# Patient Record
Sex: FEMALE | Race: WHITE | NOT HISPANIC OR LATINO | Employment: OTHER | URBAN - METROPOLITAN AREA
[De-identification: names, ages, dates, MRNs, and addresses within clinical notes are randomized per-mention and may not be internally consistent; named-entity substitution may affect disease eponyms.]

---

## 2023-08-10 LAB — CRC RECOMMENDATION EXT: NORMAL

## 2024-02-01 ENCOUNTER — OFFICE VISIT (OUTPATIENT)
Dept: GASTROENTEROLOGY | Facility: CLINIC | Age: 74
End: 2024-02-01
Payer: MEDICARE

## 2024-02-01 ENCOUNTER — LAB VISIT (OUTPATIENT)
Dept: LAB | Facility: HOSPITAL | Age: 74
End: 2024-02-01
Attending: NURSE PRACTITIONER
Payer: COMMERCIAL

## 2024-02-01 VITALS — WEIGHT: 125.69 LBS | HEIGHT: 68 IN | BODY MASS INDEX: 19.05 KG/M2

## 2024-02-01 DIAGNOSIS — R10.30 LOWER ABDOMINAL PAIN: Primary | ICD-10-CM

## 2024-02-01 DIAGNOSIS — R63.4 WEIGHT LOSS: ICD-10-CM

## 2024-02-01 DIAGNOSIS — R12 HEARTBURN: ICD-10-CM

## 2024-02-01 DIAGNOSIS — R11.2 NON-INTRACTABLE VOMITING WITH NAUSEA: ICD-10-CM

## 2024-02-01 DIAGNOSIS — R19.8 TENESMUS: ICD-10-CM

## 2024-02-01 PROBLEM — N81.89 PELVIC FLOOR WEAKNESS: Status: ACTIVE | Noted: 2024-02-01

## 2024-02-01 LAB
ERYTHROCYTE [DISTWIDTH] IN BLOOD BY AUTOMATED COUNT: 13.2 % (ref 11.5–14.5)
HCT VFR BLD AUTO: 46.3 % (ref 37–48.5)
HGB BLD-MCNC: 15.3 G/DL (ref 12–16)
MCH RBC QN AUTO: 32.8 PG (ref 27–31)
MCHC RBC AUTO-ENTMCNC: 33 G/DL (ref 32–36)
MCV RBC AUTO: 99 FL (ref 82–98)
PLATELET # BLD AUTO: 395 K/UL (ref 150–450)
PMV BLD AUTO: 10.3 FL (ref 9.2–12.9)
RBC # BLD AUTO: 4.66 M/UL (ref 4–5.4)
WBC # BLD AUTO: 9.89 K/UL (ref 3.9–12.7)

## 2024-02-01 PROCEDURE — 99203 OFFICE O/P NEW LOW 30 MIN: CPT | Mod: PBBFAC,PO | Performed by: NURSE PRACTITIONER

## 2024-02-01 PROCEDURE — 80076 HEPATIC FUNCTION PANEL: CPT | Performed by: NURSE PRACTITIONER

## 2024-02-01 PROCEDURE — 85027 COMPLETE CBC AUTOMATED: CPT | Performed by: NURSE PRACTITIONER

## 2024-02-01 PROCEDURE — 99204 OFFICE O/P NEW MOD 45 MIN: CPT | Mod: S$PBB,,, | Performed by: NURSE PRACTITIONER

## 2024-02-01 PROCEDURE — 36415 COLL VENOUS BLD VENIPUNCTURE: CPT | Mod: PO | Performed by: NURSE PRACTITIONER

## 2024-02-01 PROCEDURE — 99999 PR PBB SHADOW E&M-NEW PATIENT-LVL III: CPT | Mod: PBBFAC,,, | Performed by: NURSE PRACTITIONER

## 2024-02-01 PROCEDURE — 84443 ASSAY THYROID STIM HORMONE: CPT | Performed by: NURSE PRACTITIONER

## 2024-02-01 PROCEDURE — 86364 TISS TRNSGLTMNASE EA IG CLAS: CPT | Performed by: NURSE PRACTITIONER

## 2024-02-01 PROCEDURE — 82565 ASSAY OF CREATININE: CPT | Performed by: NURSE PRACTITIONER

## 2024-02-01 PROCEDURE — 82784 ASSAY IGA/IGD/IGG/IGM EACH: CPT | Performed by: NURSE PRACTITIONER

## 2024-02-01 RX ORDER — ONDANSETRON 4 MG/1
4 TABLET, ORALLY DISINTEGRATING ORAL 3 TIMES DAILY PRN
Qty: 30 TABLET | Refills: 1 | Status: SHIPPED | OUTPATIENT
Start: 2024-02-01

## 2024-02-01 RX ORDER — AMLODIPINE BESYLATE 10 MG/1
5 TABLET ORAL DAILY
COMMUNITY
End: 2024-02-28

## 2024-02-01 RX ORDER — DULOXETIN HYDROCHLORIDE 60 MG/1
60 CAPSULE, DELAYED RELEASE ORAL DAILY
COMMUNITY
Start: 2024-01-19 | End: 2024-02-28 | Stop reason: DRUGHIGH

## 2024-02-01 RX ORDER — CALCIUM CARBONATE 200(500)MG
1 TABLET,CHEWABLE ORAL 2 TIMES DAILY PRN
COMMUNITY

## 2024-02-01 RX ORDER — BROMPHENIRAMINE MALEATE, DEXTROMETHORPHAN HBR, PHENYLEPHRINE HCL, DIPHENHYDRAMINE HCL, PHENYLEPHRINE HCL 0.52G
0.52 KIT ORAL DAILY
COMMUNITY

## 2024-02-01 RX ORDER — LANOLIN ALCOHOL/MO/W.PET/CERES
100 CREAM (GRAM) TOPICAL DAILY
COMMUNITY
End: 2024-02-28

## 2024-02-01 RX ORDER — ESOMEPRAZOLE MAGNESIUM 40 MG/1
40 CAPSULE, DELAYED RELEASE ORAL
COMMUNITY
End: 2024-02-01 | Stop reason: ALTCHOICE

## 2024-02-01 RX ORDER — OMEPRAZOLE 40 MG/1
40 CAPSULE, DELAYED RELEASE ORAL
Qty: 30 CAPSULE | Refills: 1 | Status: SHIPPED | OUTPATIENT
Start: 2024-02-01

## 2024-02-01 RX ORDER — ACETAMINOPHEN 500 MG
5000 TABLET ORAL DAILY
COMMUNITY
End: 2024-02-28

## 2024-02-01 NOTE — PROGRESS NOTES
Subjective:       Patient ID: Maggy Sheets is a 73 y.o. female Body mass index is 19.11 kg/m².    Chief Complaint: Abdominal Pain    This patient is new to me.     Patient reports she lives in [place] and came down to visit family the day after [place] and has not gone back because she has not felt well enough to go back home. Reports has not felt well for the past 7 months. Reports has seen a GI in [facility] for her symptoms. Had a bladder lift done there and was also evaluated for her GI symptoms. Reports history of collagenous colitis. Treated with steroid for it. Reports had imaging and colonoscopy done in [place] but she does not have copy of her medical records. Reports she may be going home soon.    Abdominal Pain  This is a chronic problem. The current episode started more than 1 year ago (started several years ago). The problem occurs every several days. Duration: lasts all day. The problem has been unchanged (has when she is constipated). The pain is located in the LLQ and RLQ. The pain is at a severity of 0/10 (currently). Quality: described as gas pain, burning. Associated symptoms include belching, constipation (bowel movements are usually 3-4 times a day after she eats; constipation described as tenesmus), flatus, nausea (occurs most days, and lasts almost all day), vomiting (occurs daily, emesis of bile) and weight loss (lost ~60 lbs over the past 7 months per patient report). Pertinent negatives include no diarrhea (has resolved since she finished steroid course), dysuria, fever, frequency, hematochezia or melena. Nothing (fried foods) aggravates the pain. She has tried proton pump inhibitors (nexium 40 mg once daily, tums PRN, metamucil daily) for the symptoms. Prior diagnostic workup includes lower endoscopy and GI consult. Her past medical history is significant for abdominal surgery and GERD (frequent despite taking nexium (has been on for several years)).     Review of Systems    Constitutional:  Positive for appetite change (decreased at times; somedays not able to eat anything) and weight loss (lost ~60 lbs over the past 7 months per patient report). Negative for chills, fatigue and fever.   HENT:  Negative for sore throat and trouble swallowing.    Respiratory:  Negative for cough, choking and shortness of breath.    Cardiovascular:  Negative for chest pain.   Gastrointestinal:  Positive for abdominal pain, constipation (bowel movements are usually 3-4 times a day after she eats; constipation described as tenesmus), flatus, nausea (occurs most days, and lasts almost all day) and vomiting (occurs daily, emesis of bile). Negative for anal bleeding, blood in stool, diarrhea (has resolved since she finished steroid course), hematochezia, melena and rectal pain.   Genitourinary:  Negative for difficulty urinating, dysuria, flank pain and frequency.   Neurological:  Negative for weakness.       No LMP recorded. Patient is postmenopausal.   Past Medical History:   Diagnosis Date    Collagenous colitis     treated with steroids    Colon polyp     Hiatal hernia      Past Surgical History:   Procedure Laterality Date    APPENDECTOMY      bladder lift      CHOLECYSTECTOMY      COLONOSCOPY      ~10/2023, [place], had 3 colon polyps removed and collagenous colitis    HERNIA REPAIR       Family History   Problem Relation Age of Onset    Colon cancer Maternal Grandmother     Crohn's disease Neg Hx     Ulcerative colitis Neg Hx     Stomach cancer Neg Hx     Esophageal cancer Neg Hx     Celiac disease Neg Hx      Social History     Tobacco Use    Smoking status: Every Day     Current packs/day: 0.50     Types: Cigarettes    Smokeless tobacco: Never   Substance Use Topics    Alcohol use: Yes     Comment: socially, last was 21 days ago     Wt Readings from Last 10 Encounters:   02/01/24 57 kg (125 lb 10.6 oz)     Objective:      Physical Exam  Vitals and nursing note reviewed.    Constitutional:       General: She is not in acute distress.     Appearance: Normal appearance. She is well-developed. She is not diaphoretic.   HENT:      Mouth/Throat:      Lips: Pink. No lesions.      Mouth: Mucous membranes are moist. No oral lesions.      Tongue: No lesions.      Pharynx: Oropharynx is clear. No pharyngeal swelling or posterior oropharyngeal erythema.   Eyes:      General: No scleral icterus.     Conjunctiva/sclera: Conjunctivae normal.   Pulmonary:      Effort: Pulmonary effort is normal. No respiratory distress.      Breath sounds: Normal breath sounds. No wheezing.   Abdominal:      General: Bowel sounds are normal. There is no distension or abdominal bruit.      Palpations: Abdomen is soft. Abdomen is not rigid. There is no mass.      Tenderness: There is no abdominal tenderness. There is no guarding or rebound. Negative signs include Vidal's sign and McBurney's sign.   Skin:     General: Skin is warm and dry.      Coloration: Skin is not jaundiced or pale.      Findings: No erythema or rash.   Neurological:      Mental Status: She is alert and oriented to person, place, and time.   Psychiatric:         Behavior: Behavior normal.         Thought Content: Thought content normal.         Judgment: Judgment normal.         Assessment:       1. Lower abdominal pain    2. Tenesmus    3. Weight loss    4. Non-intractable vomiting with nausea    5. Heartburn        Plan:     Informed patient that we will order testing, but recommend she follow-up with her local GI provider once she is back home. Patient verbalized understanding and patient agreed with management plan.    Lower abdominal pain  -     CT Abdomen Pelvis With IV Contrast Routine Oral Contrast; Future; Expected date: 02/01/2024    Tenesmus  -     CT Abdomen Pelvis With IV Contrast Routine Oral Contrast; Future; Expected date: 02/01/2024  - Recommend daily exercise as tolerated, adequate water intake (six 8-oz glasses of water daily),  and high fiber diet. CONTINUE OTC fiber supplement, Metamucil (take as directed, separate from other oral medications by >2 hours).  -If still no improvement with these measures, call/follow-up    Weight loss  -     CT Abdomen Pelvis With IV Contrast Routine Oral Contrast; Future; Expected date: 02/01/2024  - recommend scheduling EGD, discussed procedure with patient, including risks and benefits, patient verbalized understanding & patient reports she will schedule an EGD with her primary GI provider back in [place] since she will be heading back home soon    Non-intractable vomiting with nausea  -  START   omeprazole (PRILOSEC) 40 MG capsule; Take 1 capsule (40 mg total) by mouth before breakfast.  Dispense: 30 capsule; Refill: 1  -     CT Abdomen Pelvis With IV Contrast Routine Oral Contrast; Future; Expected date: 02/01/2024  -     CBC Without Differential; Future; Expected date: 02/01/2024  -     TSH; Future; Expected date: 02/01/2024  -     IgA; Future; Expected date: 02/01/2024  -     Tissue Transglutaminase, IgA; Future; Expected date: 02/01/2024  -     Hepatic Function Panel; Future; Expected date: 02/01/2024  -     Creatinine, serum; Future; Expected date: 02/01/2024  - START    ondansetron (ZOFRAN-ODT) 4 MG TbDL; Take 1 tablet (4 mg total) by mouth 3 (three) times daily as needed (nausea).  Dispense: 30 tablet; Refill: 1  - recommend scheduling EGD, discussed procedure with patient, including risks and benefits, patient verbalized understanding & patient reports she will schedule an EGD with her primary GI provider back in [place] since she will be heading back home soon    Heartburn  - DISCONTINUE NEXIUM DUE TO ALTERNATE THERAPY  -  START   omeprazole (PRILOSEC) 40 MG capsule; Take 1 capsule (40 mg total) by mouth before breakfast.  Dispense: 30 capsule; Refill: 1  - recommend scheduling EGD, discussed procedure with patient, including risks and benefits, patient verbalized understanding & patient reports she  will schedule an EGD with her primary GI provider back in [place] since she will be heading back home soon    Follow up in 1 month (on 3/1/2024), or if symptoms worsen or fail to improve.      If no improvement in symptoms or symptoms worsen, call/follow-up at clinic or go to ER.        48 minutes of total time spent on the encounter, which includes face to face time and non-face to face time preparing to see the patient (e.g., review of tests), Obtaining and/or reviewing separately obtained history, Documenting clinical information in the electronic or other health record, Independently interpreting results (not separately reported) and communicating results to the patient/family/caregiver, or Care coordination (not separately reported).

## 2024-02-01 NOTE — PATIENT INSTRUCTIONS
"Severe Abdominal Pain   The Basics   Written by the doctors and editors at [facility]   Are there different types of abdominal pain? -- Yes. "Abdominal pain" means pain in the abdomen (or belly), which is the part of the body between the chest and the genital area. This pain can happen for different reasons. It can be "chronic," which means it develops over time, or "acute," which means it starts suddenly. It can be mild or severe. A person might feel the pain all over their abdomen, or only in 1 part.   Abdominal pain can feel different for different people. It can feel sharp or crampy, or dull and steady. Some people feel better if they curl into a ball, while others need to lie flat and completely still. People often feel sick to their stomach and retch or vomit.  Doctors use the term "acute abdomen" to describe an episode of severe abdominal pain that starts suddenly and lasts for a few hours or longer. It can cause pain so severe that the person has a hard time moving or breathing and it makes them want to go to the hospital or see their doctor or nurse right away. A true acute abdomen is a medical emergency.  What causes abdominal pain? -- Lots of different things can cause abdominal pain. When pain is less severe, it can be due to something like a virus or a stomach inflammation (called "gastritis").  Acute pain that is more severe can be caused by problems with 1 or more organs in the abdomen. Organs in the abdomen can be part of the digestive, urinary, or reproductive systems (figure 1 and figure 2 and figure 3).  Conditions that affect organs in the chest or genital area can also cause pain. Even though these organs aren't in the abdomen, people might still have abdominal pain.  Common causes of acute abdominal pain in adults include:  Appendicitis - Appendicitis is the term for when the appendix (a long, thin pouch that hangs down from the large intestine) gets infected and inflamed.  Diverticulitis - " Diverticulitis is an infection that develops in small pouches that can form in the intestine. This is common in older people.  Gallstones - Gallstones are small stones that form inside an organ called the gallbladder, which stores bile, a fluid that helps the body break down fat.  Abscess - An abscess is a collection of pus. An abscess can form in the abdomen, typically near the intestine.  Kidney stones - Kidney stones can form when salts and minerals that are normally in the urine build up and harden. They can cause pain when they pass through the ureters, which are the tubes that carry urine from the kidney to the bladder.  Bowel perforation - This is a hole in the bowel wall.  Perforated ulcer - This is a hole in the wall of the stomach or intestine.  Pancreatitis - This is the term for when the pancreas gets inflamed.  Ruptured cyst in the ovary - Cysts in the ovary are fluid-filled sacs that can form in some women. They sometimes rupture, which means that they break open and spill out.  Ectopic pregnancy - An ectopic pregnancy is a pregnancy that develops outside the uterus.  Should I see a doctor or nurse? -- Yes. If you have sudden or severe abdominal pain, call your doctor or nurse or go to the hospital right away. Depending on the cause of your pain, you might need immediate treatment.  Will I need tests? -- Probably. The doctor or nurse will ask about your symptoms, including where your pain is and what it feels like. The location of the pain can be an important clue to the cause.  Your doctor will ask about your current and past medical conditions, and do a physical exam. They might do repeat exams over time to follow your symptoms.  Your doctor will decide which tests you should have based on your symptoms and individual situation. The tests might include:  Blood tests  Urine tests  X-rays  An ultrasound, CT scan, or other imaging test - Imaging tests create pictures of the inside of the body.  How is  abdominal pain treated? -- Treatment depends on what's causing the pain. It might include 1 or more of the following:  Fluids given by IV  Pain medicines  Antibiotic medicines to treat an infection  Other medicines to treat other medical conditions  Surgery  All topics are updated as new evidence becomes available and our peer review process is complete.  This topic retrieved from [employer] on: Sep 21, 2021.  Topic 71444 Version 12.0  Release: 29.4.2 - C29.263  © 2021 UpToDate, Inc. and/or its affiliates. All rights reserved.  figure 1: Organs inside the abdomen (belly)     Graphic 47027 Version 6.0    figure 2: Anatomy of the urinary tract     Urine is made by the kidneys. It passes from the kidneys into the bladder through two tubes called the ureters. Then it leaves the bladder through another tube called the urethra.  Graphic 28469 Version 7.0    figure 3: Female reproductive anatomy     These are the internal organs that make up the female reproductive system.  Graphic 46366 Version 6.0    Consumer Information Use and Disclaimer   This information is not specific medical advice and does not replace information you receive from your health care provider. This is only a brief summary of general information. It does NOT include all information about conditions, illnesses, injuries, tests, procedures, treatments, therapies, discharge instructions or life-style choices that may apply to you. You must talk with your health care provider for complete information about your health and treatment options. This information should not be used to decide whether or not to accept your health care provider's advice, instructions or recommendations. Only your health care provider has the knowledge and training to provide advice that is right for you. The use of this information is governed by the [employer] End User License Agreement, available at https://www.[employer]ia/en/solutions/[employer]/about/shelby.The use of [employer]  content is governed by the [employer] Terms of Use. ©2021 UpToDate, Inc. All rights reserved.  Copyright   © 2021 UpToDate, Inc. and/or its affiliates. All rights reserved.     Understanding Diverticulosis and Diverticulitis     Pouches or diverticula usually occur in the lower part of the colon called the sigmoid.     The colon (large intestine) is the last part of the digestive tract. It absorbs water from stool and changes it from a liquid to a solid. In certain cases, small pouches called diverticula can form in the colon wall. This condition is called diverticulosis. The pouches can become infected. If this happens, it becomes a more serious problem called diverticulitis. These problems can be painful. But they can be managed.  Managing your condition  Diet changes or medicines may be prescribed.   If you have diverticulosis  Recommendations include:  Diet changes are often enough to control symptoms. The main changes are adding fiber (roughage) and drinking more water. Fiber absorbs water as it travels through your colon. This helps your stool stay soft and move smoothly. Water helps this process.  If needed, you may be told to take over-the-counter stool softeners.  To help relieve pain, antispasmodic medicines may be prescribed.  Watch for changes in your bowel movements. Tell the healthcare provider if you notice any changes.  Begin an exercise program. Ask your healthcare provider how to get started.  Get plenty of rest and sleep.   If you have diverticulitis  Treatment depends on how bad your symptoms are.  For mild symptoms. You may be put on a liquid diet for a short time. Antibiotics are usually prescribed. If these two steps relieve your symptoms, you may then be prescribed a high-fiber diet. If you still have symptoms, your healthcare provider will discuss more treatment choices with you.  For severe symptoms. You may need to be admitted to the hospital. There, you can be given IV antibiotics and  fluids. You will also be put on a low-fiber or liquid diet. Although not common, surgery is needed in some people with severe symptoms.  Gary to colon health     Diverticulitis occurs when the pouches become infected or inflamed.     Help keep your colon healthy with a diet that includes plenty of high-fiber fruits, vegetables, and whole grains. Drink plenty of liquids like water and juice. Maintain a healthy lifestyle including regular exercise, stress management, and adequate rest and sleep.   Date Last Reviewed: 7/1/2016  © 5971-1009 [employer]. [address] [place] [address]. All rights reserved. This information is not intended as a substitute for professional medical care. Always follow your healthcare professional's instructions.

## 2024-02-02 LAB
ALBUMIN SERPL BCP-MCNC: 3.8 G/DL (ref 3.5–5.2)
ALP SERPL-CCNC: 52 U/L (ref 55–135)
ALT SERPL W/O P-5'-P-CCNC: 10 U/L (ref 10–44)
AST SERPL-CCNC: 15 U/L (ref 10–40)
BILIRUB DIRECT SERPL-MCNC: 0.2 MG/DL (ref 0.1–0.3)
BILIRUB SERPL-MCNC: 0.6 MG/DL (ref 0.1–1)
CREAT SERPL-MCNC: 0.9 MG/DL (ref 0.5–1.4)
EST. GFR  (NO RACE VARIABLE): >60 ML/MIN/1.73 M^2
IGA SERPL-MCNC: 215 MG/DL (ref 40–350)
PROT SERPL-MCNC: 6.5 G/DL (ref 6–8.4)
TSH SERPL DL<=0.005 MIU/L-ACNC: 2.36 UIU/ML (ref 0.4–4)

## 2024-02-05 LAB — TTG IGA SER-ACNC: 0.5 U/ML

## 2024-02-12 ENCOUNTER — TELEPHONE (OUTPATIENT)
Dept: GASTROENTEROLOGY | Facility: CLINIC | Age: 74
End: 2024-02-12
Payer: COMMERCIAL

## 2024-02-12 ENCOUNTER — PATIENT MESSAGE (OUTPATIENT)
Dept: GASTROENTEROLOGY | Facility: CLINIC | Age: 74
End: 2024-02-12
Payer: COMMERCIAL

## 2024-02-12 NOTE — TELEPHONE ENCOUNTER
----- Message from Gabrielle Goldman sent at 2/12/2024 11:20 AM CST -----  Contact: Pam [phone]  Type:  Same Day Appointment Request    Caller is requesting a same day appointment.  Caller declined first available appointment listed below.      Name of Caller:  Pts daughter Pam   When is the first available appointment?  02/19  Symptoms:  Nausea/ not able to eat anything in three days   Best Call Back Number:  [phone]  Additional Information:   Pls call back and advise

## 2024-02-12 NOTE — TELEPHONE ENCOUNTER
Spoke with patient and daughter and advised them that we have no availability this week. Was going to tell them to go to the ER, but they stated that they are already there.

## 2024-02-14 ENCOUNTER — PATIENT MESSAGE (OUTPATIENT)
Dept: GASTROENTEROLOGY | Facility: CLINIC | Age: 74
End: 2024-02-14
Payer: COMMERCIAL

## 2024-02-15 ENCOUNTER — HOSPITAL ENCOUNTER (OUTPATIENT)
Dept: RADIOLOGY | Facility: HOSPITAL | Age: 74
Discharge: HOME | End: 2024-02-15
Attending: NURSE PRACTITIONER
Payer: MEDICARE

## 2024-02-15 DIAGNOSIS — R63.4 WEIGHT LOSS: ICD-10-CM

## 2024-02-15 DIAGNOSIS — R19.8 TENESMUS: ICD-10-CM

## 2024-02-15 DIAGNOSIS — R10.30 LOWER ABDOMINAL PAIN: ICD-10-CM

## 2024-02-15 DIAGNOSIS — R11.2 NON-INTRACTABLE VOMITING WITH NAUSEA: ICD-10-CM

## 2024-02-15 PROCEDURE — 74177 CT ABD & PELVIS W/CONTRAST: CPT | Mod: TC,PO

## 2024-02-15 PROCEDURE — 25500020 PHARM REV CODE 255: Mod: PO | Performed by: NURSE PRACTITIONER

## 2024-02-15 PROCEDURE — 74177 CT ABD & PELVIS W/CONTRAST: CPT | Mod: 26,,, | Performed by: RADIOLOGY

## 2024-02-15 PROCEDURE — A9698 NON-RAD CONTRAST MATERIALNOC: HCPCS | Mod: PO | Performed by: NURSE PRACTITIONER

## 2024-02-15 RX ADMIN — IOHEXOL 75 ML: 350 INJECTION, SOLUTION INTRAVENOUS at 03:02

## 2024-02-15 RX ADMIN — IOHEXOL 1000 ML: 9 SOLUTION ORAL at 03:02

## 2024-02-16 ENCOUNTER — TELEPHONE (OUTPATIENT)
Dept: GASTROENTEROLOGY | Facility: CLINIC | Age: 74
End: 2024-02-16
Payer: COMMERCIAL

## 2024-02-16 DIAGNOSIS — K52.9 COLITIS: Primary | ICD-10-CM

## 2024-02-16 DIAGNOSIS — K76.9 LIVER LESION: ICD-10-CM

## 2024-02-16 RX ORDER — CIPROFLOXACIN 500 MG/1
500 TABLET ORAL 2 TIMES DAILY
Qty: 14 TABLET | Refills: 0 | Status: SHIPPED | OUTPATIENT
Start: 2024-02-16 | End: 2024-02-28

## 2024-02-16 RX ORDER — METRONIDAZOLE 500 MG/1
500 TABLET ORAL 3 TIMES DAILY
Qty: 21 TABLET | Refills: 0 | Status: SHIPPED | OUTPATIENT
Start: 2024-02-16 | End: 2024-02-28

## 2024-02-16 NOTE — TELEPHONE ENCOUNTER
Called and discussed results and recommendations with pt. Pt verbalized understanding to all. Results also sent to pts mychart per pt request.

## 2024-02-16 NOTE — TELEPHONE ENCOUNTER
"Please call to inform & review the results with the patient- radiology report of the CT scan of the abdomen and pelvis showed "Questionable nondistention versus mild diffuse colitis changes which can be correlated with clinical history.  No focal inflammatory changes or obstruction." The radiologist could not tell if the colon was not distended well enough to evaluate it properly or possible signs of colitis. Sent in a course of antibiotics to pharmacy on file to treat possible colitis. Follow-up in 2-4 weeks.  If patient is still not able to keep food and fluids down then patient needs to go to the ER for further evaluation and management.    Mixed type moderate size hiatal hernia. Continue omeprazole and GERD recommendations.  Diverticulosis noted without signs of diverticulitis.  Mild bile duct dilatation which is likely from having prior gallbladder surgery since liver function labs were within normal limits on recent testing.    "Nonspecific liver hypodensities which could represent small cysts or hemangiomas [both of these are typically benign findings] in addition to bilateral cystic appearing renal lesions the majority of which appear to be simple cyst with mildly complex cyst on the left.  Given somewhat nonspecific findings, both of these could be further assessed with abdominal MRI."   Please verify that the patient does not have a pacemaker/defibrillator, metal implant, cerebral aneurysm or surgical clip, pump, middle or inner ear prosthetic, or nerve or brain stimulator, if so, please notify me so I can adjust the order accordingly. Please ask if patient is claustrophobic, if so, let me know.    "Incidental 3 mm pulmonary micronodule within the right middle lobe", small amount of fluid around the heart, & degenerative changes of the spine: Recommend follow-up with Primary Care Provider for continued evaluation and management of these findings.    Continue with previous recommendations. If no improvement " in symptoms or symptoms worsen, call/follow-up at clinic or go to ER.    Thanks,

## 2024-02-28 ENCOUNTER — OFFICE VISIT (OUTPATIENT)
Dept: FAMILY MEDICINE | Facility: CLINIC | Age: 74
End: 2024-02-28
Payer: MEDICARE

## 2024-02-28 ENCOUNTER — HOSPITAL ENCOUNTER (OUTPATIENT)
Dept: RADIOLOGY | Facility: OTHER | Age: 74
Discharge: HOME | End: 2024-02-28
Attending: NURSE PRACTITIONER
Payer: MEDICARE

## 2024-02-28 VITALS
WEIGHT: 111 LBS | BODY MASS INDEX: 16.82 KG/M2 | DIASTOLIC BLOOD PRESSURE: 88 MMHG | SYSTOLIC BLOOD PRESSURE: 119 MMHG | HEART RATE: 110 BPM | TEMPERATURE: 99 F | HEIGHT: 68 IN

## 2024-02-28 DIAGNOSIS — K76.9 LIVER LESION: ICD-10-CM

## 2024-02-28 DIAGNOSIS — R11.2 NAUSEA AND VOMITING, UNSPECIFIED VOMITING TYPE: Primary | ICD-10-CM

## 2024-02-28 DIAGNOSIS — R63.4 WEIGHT LOSS: ICD-10-CM

## 2024-02-28 DIAGNOSIS — F32.2 CURRENT SEVERE EPISODE OF MAJOR DEPRESSIVE DISORDER WITHOUT PSYCHOTIC FEATURES WITHOUT PRIOR EPISODE: ICD-10-CM

## 2024-02-28 DIAGNOSIS — E87.6 HYPOKALEMIA: ICD-10-CM

## 2024-02-28 PROCEDURE — 25500020 PHARM REV CODE 255: Performed by: NURSE PRACTITIONER

## 2024-02-28 PROCEDURE — 99204 OFFICE O/P NEW MOD 45 MIN: CPT | Mod: S$PBB,,, | Performed by: INTERNAL MEDICINE

## 2024-02-28 PROCEDURE — 74183 MRI ABD W/O CNTR FLWD CNTR: CPT | Mod: 26,,, | Performed by: RADIOLOGY

## 2024-02-28 PROCEDURE — A9585 GADOBUTROL INJECTION: HCPCS | Performed by: NURSE PRACTITIONER

## 2024-02-28 PROCEDURE — 99999 PR PBB SHADOW E&M-EST. PATIENT-LVL III: CPT | Mod: PBBFAC,,, | Performed by: INTERNAL MEDICINE

## 2024-02-28 PROCEDURE — 74183 MRI ABD W/O CNTR FLWD CNTR: CPT | Mod: TC

## 2024-02-28 PROCEDURE — 99213 OFFICE O/P EST LOW 20 MIN: CPT | Mod: PBBFAC,25,PO | Performed by: INTERNAL MEDICINE

## 2024-02-28 RX ORDER — METOCLOPRAMIDE 10 MG/1
10 TABLET ORAL 4 TIMES DAILY
Qty: 40 TABLET | Refills: 0 | Status: SHIPPED | OUTPATIENT
Start: 2024-02-28 | End: 2024-03-21 | Stop reason: SDUPTHER

## 2024-02-28 RX ORDER — GADOBUTROL 604.72 MG/ML
6 INJECTION INTRAVENOUS
Status: COMPLETED | OUTPATIENT
Start: 2024-02-28 | End: 2024-02-28

## 2024-02-28 RX ORDER — AMLODIPINE AND BENAZEPRIL HYDROCHLORIDE 5; 20 MG/1; MG/1
1 CAPSULE ORAL DAILY
COMMUNITY
Start: 2024-02-22

## 2024-02-28 RX ORDER — DULOXETIN HYDROCHLORIDE 30 MG/1
CAPSULE, DELAYED RELEASE ORAL
Qty: 21 CAPSULE | Refills: 0 | Status: SHIPPED | OUTPATIENT
Start: 2024-02-28 | End: 2024-03-25 | Stop reason: ALTCHOICE

## 2024-02-28 RX ORDER — CITALOPRAM 10 MG/1
TABLET ORAL
Qty: 42 TABLET | Refills: 0 | Status: SHIPPED | OUTPATIENT
Start: 2024-02-28 | End: 2024-03-25

## 2024-02-28 RX ADMIN — GADOBUTROL 6 ML: 604.72 INJECTION INTRAVENOUS at 09:02

## 2024-02-28 NOTE — PROGRESS NOTES
Assessment/Plan:    Problem List Items Addressed This Visit          Psychiatric    Current severe episode of major depressive disorder without psychotic features without prior episode    Overview     -chronic  -significant worsening over the past several months, especially since stopping EtOH use  -has been on cymbalta chronically for many years  -has tried lexapro in the past but ineffective  -discussed switching from cymbalta to celexa instead  -discussed risk of discontinuing this medication without tapering once established  -patient was educated, advised of side effects, and all questions were answered.  Patient voiced understanding  -patient will follow up routinely and notify us if having any side effects or worsening or persistent symptoms.  ER precautions were given.         Relevant Medications    citalopram (CELEXA) 10 MG tablet    DULoxetine (CYMBALTA) 30 MG capsule     Other Visit Diagnoses       Nausea and vomiting, unspecified vomiting type    -  Primary    Relevant Medications    metoclopramide HCl (REGLAN) 10 MG tablet    Other Relevant Orders    Comprehensive Metabolic Panel (Completed)    CBC Auto Differential (Completed)    Urinalysis (Completed)    Weight loss        Hypokalemia                Follow up for labs today: cbc,cmp,UA.    Maria Victoria Duque MD  ______________________________________________________________________________________________________________________________    CC: Establish care    HPI:    Patient is a new patient to me here to establish care.     Previous PCP: located in [place]    Patient recently came here to [place] prior to [place] to visit her daughter. She lives in [place]. She began developing health issues so has remained here with her daughter. Began experiencing fatigue and severe nausea, occasional vomiting and diarrhea. She was evaluated by GI. CT obtained which showed colitis and liver abnormality. Follow up MRI completed today which only shows fatty  infiltration and a hepatic cyst, no other concerning findings. She was treated with cipro/flagyl x 1 week for colitis findings on CT. Patient reports having an EGD/c-scope in Aug 2023 in [place], [place] and was diagnosed with collagenous colitis and started on course of steroid, which she completed. Endoscopy records are not on her current chart that I can see. She has continued to experience GI symptoms since completing antibiotics. She was evaluated in the ER on 2/12 for nausea and malaise and diagnosed with COVID although the patient and her daughter state that the symptoms she went to ER for had been present for >6 weeks and have not changed since then so they did not believe they were related to COVID. She found to be hypokalemic in ED but no other significant abnormal findings on work up. She has continued to have significant nausea and anorexia and has lost 20 lbs in just the past 2 weeks. She has zofran at home but denies improvement of symptoms with use. Her daughter reports that patient has been a heavy drinker for the past 15 years and quit drinking just prior to coming to visit. She is concerned that this has caused a severe worsening in her chronic depression and is concerned that this is contributing to ongoing symptoms. Patient has been on cymbalta for years for her chronic depression. She reports trying lexparo at some point in the past but did not find it effective. She has never been on any other medications. The patient does admit that her depression is severe at this time and could be the root of some of her issues. She denies SI/HI. She denies any other current symptoms, including HA, vision changes, fever, chest pain, SOB, urinary symptoms, joint pain/swelling. We discussed reaching out to GI for further recommendations but she is also open to switching depression symptoms.     No other new complaints today.        No other new complaints today.  Remaining chronic conditions have been  reviewed and remain stable. Further detail as stated above.      HM reviewed.    Past Medical History:  Past Medical History:   Diagnosis Date    Collagenous colitis     treated with steroids    Colon polyp     Hiatal hernia      Past Surgical History:   Procedure Laterality Date    APPENDECTOMY      bladder lift      CHOLECYSTECTOMY      COLONOSCOPY      ~10/2023, [facility], had 3 colon polyps removed and collagenous colitis    FRACTURE SURGERY      HERNIA REPAIR      HYSTERECTOMY      TONSILLECTOMY      TUBAL LIGATION       Review of patient's allergies indicates:   Allergen Reactions    Codeine Nausea And Vomiting     Social History     Tobacco Use    Smoking status: Every Day     Current packs/day: 0.25     Average packs/day: 0.3 packs/day for 15.0 years (3.8 ttl pk-yrs)     Types: Cigarettes    Smokeless tobacco: Never   Substance Use Topics    Alcohol use: Yes     Alcohol/week: 6.0 standard drinks of alcohol     Types: 6 Shots of liquor per week     Comment: socially, last was 21 days ago    Drug use: Not Currently     Family History   Problem Relation Age of Onset    Colon cancer Maternal Grandmother     Arthritis Mother     Depression Mother     Vision loss Father     Crohn's disease Neg Hx     Ulcerative colitis Neg Hx     Stomach cancer Neg Hx     Esophageal cancer Neg Hx     Celiac disease Neg Hx      Current Outpatient Medications on File Prior to Visit   Medication Sig Dispense Refill    amlodipine-benazepril 5-20 mg (LOTREL) 5-20 mg per capsule Take 1 capsule by mouth once daily.      calcium carbonate (TUMS) 200 mg calcium (500 mg) chewable tablet Take 1 tablet by mouth 2 (two) times daily as needed for Heartburn.      omeprazole (PRILOSEC) 40 MG capsule Take 1 capsule (40 mg total) by mouth before breakfast. 30 capsule 1    ondansetron (ZOFRAN-ODT) 4 MG TbDL Take 1 tablet (4 mg total) by mouth 3 (three) times daily as needed (nausea). 30 tablet 1    psyllium 0.52 gram capsule Take 0.52 g  "by mouth once daily.       No current facility-administered medications on file prior to visit.       Review of Systems   Constitutional:  Positive for appetite change, fatigue and unexpected weight change. Negative for chills, diaphoresis and fever.   HENT:  Negative for congestion, ear pain, postnasal drip, sinus pain and sore throat.    Eyes:  Negative for pain and redness.   Respiratory:  Negative for cough, chest tightness and shortness of breath.    Cardiovascular:  Negative for chest pain and leg swelling.   Gastrointestinal:  Positive for diarrhea, nausea and vomiting. Negative for abdominal pain and constipation.   Genitourinary:  Negative for dysuria and hematuria.   Musculoskeletal:  Negative for arthralgias and joint swelling.   Skin:  Negative for rash.   Neurological:  Negative for dizziness, syncope and headaches.   Psychiatric/Behavioral:  Positive for dysphoric mood. Negative for self-injury and suicidal ideas. The patient is not nervous/anxious.        Vitals:    02/28/24 1323   BP: 119/88   Pulse: 110   Temp: 98.6 °F (37 °C)   Weight: 50.3 kg (111 lb)   Height: 5' 8" (1.727 m)       Wt Readings from Last 3 Encounters:   02/28/24 50.3 kg (111 lb)   02/12/24 59 kg (130 lb)   02/01/24 57 kg (125 lb 10.6 oz)       Physical Exam    Health Maintenance   Topic Date Due    Hepatitis C Screening  Never done    Lipid Panel  Never done    TETANUS VACCINE  Never done    Mammogram  Never done    DEXA Scan  Never done    Colorectal Cancer Screening  Never done    Shingles Vaccine (1 of 2) Never done     "

## 2024-02-28 NOTE — Clinical Note
Ramone Jo. I saw this patient for the first time Wednesday. She is still having severe nausea and occasional vomiting. She has lost almost 20 lbs in the last 2 weeks. There is concern that depression is contributing to some of her symptoms so we are switching meds but also wanted to reach out to you about whether other testing is needed, like an EGD or gastric emptying. I know she supposedly had an EGD last Aug but I could not find the report in her chart.  Thx! Maria Victoria Duque

## 2024-03-01 PROBLEM — F32.2 CURRENT SEVERE EPISODE OF MAJOR DEPRESSIVE DISORDER WITHOUT PSYCHOTIC FEATURES WITHOUT PRIOR EPISODE: Status: ACTIVE | Noted: 2024-03-01

## 2024-03-01 RX ORDER — POTASSIUM CHLORIDE 750 MG/1
10 CAPSULE, EXTENDED RELEASE ORAL DAILY
Qty: 30 CAPSULE | Refills: 11 | Status: SHIPPED | OUTPATIENT
Start: 2024-03-01 | End: 2025-03-01

## 2024-03-04 ENCOUNTER — TELEPHONE (OUTPATIENT)
Dept: FAMILY MEDICINE | Facility: CLINIC | Age: 74
End: 2024-03-04
Payer: COMMERCIAL

## 2024-03-04 ENCOUNTER — PATIENT MESSAGE (OUTPATIENT)
Dept: FAMILY MEDICINE | Facility: CLINIC | Age: 74
End: 2024-03-04
Payer: COMMERCIAL

## 2024-03-04 DIAGNOSIS — R63.0 ANOREXIA: Primary | ICD-10-CM

## 2024-03-04 DIAGNOSIS — R11.2 NAUSEA AND VOMITING, UNSPECIFIED VOMITING TYPE: Primary | ICD-10-CM

## 2024-03-04 DIAGNOSIS — R63.4 WEIGHT LOSS: ICD-10-CM

## 2024-03-04 NOTE — TELEPHONE ENCOUNTER
----- Message from ADONIS Wallis sent at 3/4/2024 10:34 AM CST -----  I had discussed with the patient about scheduling an EGD to further evaluate her symptoms at our appointment but patient had declined because she said she was planning on going back home to [place] soon and she was going to follow-up with her GI provider there for it. I did not see prior EGD on file either. The patient told me she would send me a copy of her prior records, but the only thing we received was a health summary, no actual endoscopy reports.  Deysi    ----- Message -----  From: Maria Victoria Duuqe MD  Sent: 3/1/2024  10:53 AM CST  To: ADONIS Wallis. I saw this patient for the first time Wednesday. She is still having severe nausea and occasional vomiting. She has lost almost 20 lbs in the last 2 weeks. There is concern that depression is contributing to some of her symptoms so we are switching meds but also wanted to reach out to you about whether other testing is needed, like an EGD or gastric emptying. I know she supposedly had an EGD last Aug but I could not find the report in her chart.   Thx!  Maria Victoria Duque

## 2024-03-04 NOTE — TELEPHONE ENCOUNTER
Please let patient know that I am going to go ahead and place an order for an EGD. Also let her know that I asked GI and they never received the records of her previous scopes and I could not find them in chart.     I have signed for the following orders AND/OR meds.  Please call the patient and ask the patient to schedule the testing AND/OR inform about any medications that were sent. Medications have been sent to pharmacy listed below      Orders Placed This Encounter   Procedures    Ambulatory referral/consult to Endo Procedure      Standing Status:   Future     Standing Expiration Date:   9/30/2024     Referral Priority:   Routine     Referral Type:   Consultation     Number of Visits Requested:   1              [id number] - [place] - [address] AT SEC OF [facility] [address] [facility]  [address]  [place] [address]  Phone: [phone] Fax: [phone]

## 2024-03-05 ENCOUNTER — PATIENT MESSAGE (OUTPATIENT)
Dept: FAMILY MEDICINE | Facility: CLINIC | Age: 74
End: 2024-03-05
Payer: COMMERCIAL

## 2024-03-05 ENCOUNTER — TELEPHONE (OUTPATIENT)
Dept: FAMILY MEDICINE | Facility: CLINIC | Age: 74
End: 2024-03-05
Payer: COMMERCIAL

## 2024-03-05 RX ORDER — MEGESTROL ACETATE 40 MG/1
40 TABLET ORAL DAILY
Qty: 30 TABLET | Refills: 11 | Status: SHIPPED | OUTPATIENT
Start: 2024-03-05 | End: 2025-03-05

## 2024-03-05 NOTE — TELEPHONE ENCOUNTER
She can try megace once daily to see if this helps with appetite    I have signed for the following orders AND/OR meds.  Please call the patient and ask the patient to schedule the testing AND/OR inform about any medications that were sent. Medications have been sent to pharmacy listed below      No orders of the defined types were placed in this encounter.      Medications Ordered This Encounter   Medications    megestroL (MEGACE) 40 MG Tab     Sig: Take 1 tablet (40 mg total) by mouth once daily.     Dispense:  30 tablet     Refill:  11         [id number] - [place] - [address] [facility] [id number] [facility]  [place] [address]  Phone: [phone] Fax: [phone]

## 2024-03-05 NOTE — TELEPHONE ENCOUNTER
----- Message from Anahy Sahni sent at 3/5/2024  9:41 AM CST -----  Regarding: concerns  Name of who is calling:   daughter / Pam      What is the request in detail: Pam is requesting a call back in ref to downloaded past med records on to the [facility]      Can the clinic reply by [facility]:yes      What number to call back if not [facility]: [phone]

## 2024-03-06 DIAGNOSIS — Z78.0 MENOPAUSE: ICD-10-CM

## 2024-03-11 ENCOUNTER — HOSPITAL ENCOUNTER (OUTPATIENT)
Dept: RADIOLOGY | Facility: HOSPITAL | Age: 74
Discharge: HOME | End: 2024-03-11
Attending: INTERNAL MEDICINE
Payer: MEDICARE

## 2024-03-11 ENCOUNTER — PATIENT MESSAGE (OUTPATIENT)
Dept: FAMILY MEDICINE | Facility: CLINIC | Age: 74
End: 2024-03-11
Payer: COMMERCIAL

## 2024-03-11 DIAGNOSIS — Z78.0 MENOPAUSE: ICD-10-CM

## 2024-03-11 PROCEDURE — 77080 DXA BONE DENSITY AXIAL: CPT | Mod: TC,PO

## 2024-03-11 PROCEDURE — 77080 DXA BONE DENSITY AXIAL: CPT | Mod: 26,,, | Performed by: RADIOLOGY

## 2024-03-18 ENCOUNTER — HOSPITAL ENCOUNTER (OUTPATIENT)
Dept: PREADMISSION TESTING | Facility: HOSPITAL | Age: 74
Discharge: HOME | End: 2024-03-18
Attending: INTERNAL MEDICINE
Payer: MEDICARE

## 2024-03-18 DIAGNOSIS — R11.2 NAUSEA AND VOMITING, UNSPECIFIED VOMITING TYPE: ICD-10-CM

## 2024-03-18 DIAGNOSIS — R63.4 WEIGHT LOSS: Primary | ICD-10-CM

## 2024-03-18 NOTE — PROGRESS NOTES
Patient's DEXA scan results have been sent via portal. Please verify that patient has viewed results. If not, please call patient with interpretation below:    -Your recent DEXA scan shows osteopenia.    -I recommend that you start using weight bearing exercises to help reduce the risk of a fracture.    -Also, please start taking over the counter calcium 1200 mg daily and Vitamin D3 1000 units daily.  -We will plan to recheck your DEXA scan in 2 years.      Also please see below health maintenance items that are due:    Hepatitis C Screening Never done  Lipid Panel Never done  COVID-19 Vaccine(1) Never done  Pneumococcal Vaccines (Age 65+)(1 of 2 - PCV) Never done  TETANUS VACCINE Never done  Mammogram Never done  Colorectal Cancer Screening Never done  Shingles Vaccine(1 of 2) Never done  RSV Vaccine (Age 60+ and Pregnant patients)(1 - 1-dose 60+ series) Never done  Influenza Vaccine(1) Never done

## 2024-03-19 ENCOUNTER — PATIENT OUTREACH (OUTPATIENT)
Dept: ADMINISTRATIVE | Facility: HOSPITAL | Age: 74
End: 2024-03-19
Payer: COMMERCIAL

## 2024-03-19 ENCOUNTER — ANESTHESIA EVENT (OUTPATIENT)
Dept: ENDOSCOPY | Facility: HOSPITAL | Age: 74
End: 2024-03-19
Payer: MEDICARE

## 2024-03-19 NOTE — PROGRESS NOTES
Population Health Chart Review & Patient Outreach Details      Additional [facility] Health Notes:    Manually uploaded Colonoscopy to media.     MARK faxed to [facility] for Colon Pathology/Repeat Recommendation  F: [phone]       Updates Requested / Reviewed:      Care Everywhere, , and Immunizations Reconciliation Completed or Queried: [facility] Topics Overdue:      VB Score: 2     Colon Cancer Screening  Mammogram    Influenza Vaccine  Pneumonia Vaccine  Tetanus Vaccine  Shingles/Zoster Vaccine  RSV Vaccine                  Health Maintenance Topic(s) Outreach Outcomes & Actions Taken:    Colorectal Cancer Screening - Outreach Outcomes & Actions Taken  : External Records Requested & Care Team Updated if Applicable and External Records Uploaded, Care Team Updated, & History Updated if Applicable

## 2024-03-19 NOTE — ANESTHESIA PREPROCEDURE EVALUATION
03/19/2024  Maggy Sheets is a 73 y.o., female.    Past Medical History:   Diagnosis Date    Collagenous colitis     treated with steroids    Colon polyp     Hiatal hernia      Past Surgical History:   Procedure Laterality Date    APPENDECTOMY      bladder lift      CHOLECYSTECTOMY      COLONOSCOPY      ~10/2023, [facility], had 3 colon polyps removed and collagenous colitis    FRACTURE SURGERY      HERNIA REPAIR      HYSTERECTOMY      TONSILLECTOMY      TUBAL LIGATION     No current facility-administered medications for this encounter.    Current Outpatient Medications:     amlodipine-benazepril 5-20 mg (LOTREL) 5-20 mg per capsule, Take 1 capsule by mouth once daily., Disp: , Rfl:     calcium carbonate (TUMS) 200 mg calcium (500 mg) chewable tablet, Take 1 tablet by mouth 2 (two) times daily as needed for Heartburn., Disp: , Rfl:     citalopram (CELEXA) 10 MG tablet, Take 1 tablet (10 mg total) by mouth once daily for 14 days, THEN 2 tablets (20 mg total) once daily for 14 days., Disp: 42 tablet, Rfl: 0    DULoxetine (CYMBALTA) 30 MG capsule, Take 1 capsule (30 mg total) by mouth once daily for 14 days, THEN 1 capsule (30 mg total) every other day for 14 days., Disp: 21 capsule, Rfl: 0    megestroL (MEGACE) 40 MG Tab, Take 1 tablet (40 mg total) by mouth once daily., Disp: 30 tablet, Rfl: 11    metoclopramide HCl (REGLAN) 10 MG tablet, Take 1 tablet (10 mg total) by mouth 4 (four) times daily., Disp: 40 tablet, Rfl: 0    omeprazole (PRILOSEC) 40 MG capsule, Take 1 capsule (40 mg total) by mouth before breakfast., Disp: 30 capsule, Rfl: 1    ondansetron (ZOFRAN-ODT) 4 MG TbDL, Take 1 tablet (4 mg total) by mouth 3 (three) times daily as needed (nausea)., Disp: 30 tablet, Rfl: 1    potassium chloride (MICRO-K) 10 MEQ CpSR, Take 1 capsule (10 mEq total) by mouth once daily., Disp: 30 capsule, Rfl:  11    psyllium 0.52 gram capsule, Take 0.52 g by mouth once daily., Disp: , Rfl:        Pre-op Assessment    I have reviewed the Patient Summary Reports.     I have reviewed the Nursing Notes. I have reviewed the NPO Status.   I have reviewed the Medications.     Review of Systems  Anesthesia Hx:  No problems with previous Anesthesia   Neg history of prior surgery.          Denies Family Hx of Anesthesia complications.    Denies Personal Hx of Anesthesia complications.                    Social:  Non-Smoker, Social Alcohol Use       Hepatic/GI:    Hiatal Hernia,              Neurological:    Neuromuscular Disease,                                   Psych:  Psychiatric History                  Physical Exam  General: Well nourished    Airway:  Mallampati: I   Mouth Opening: Normal  TM Distance: Normal  Tongue: Normal  Neck ROM: Extension Decreased    Dental:  Intact        Anesthesia Plan  Type of Anesthesia, risks & benefits discussed:    Anesthesia Type: Gen Natural Airway  Intra-op Monitoring Plan: Standard ASA Monitors  Post Op Pain Control Plan: multimodal analgesia  Induction:  IV  Informed Consent: Informed consent signed with the Patient and all parties understand the risks and agree with anesthesia plan.  All questions answered. Patient consented to blood products? No  ASA Score: 2  Day of Surgery Review of History & Physical: H&P Update referred to the surgeon/provider.    Ready For Surgery From Anesthesia Perspective.     .

## 2024-03-19 NOTE — LETTER
AUTHORIZATION FOR RELEASE OF   CONFIDENTIAL INFORMATION    [id number]    We are seeing Maggy Sheets, date of birth 1950, in the clinic at [facility]. Maria Victoria Duque MD is the patient's PCP. Maggy Sheets has an outstanding lab/procedure at the time we reviewed her chart. In order to help keep her health information updated, she has authorized us to request the following medical record(s):     We have received the Colonoscopy report for 08-. Please send the requested information below:                                          ( X )  COLON PATHOLOGY 08-          ( X )  COLON REPEAT RECOMMENDATION       Please fax records to [facility], Watts, Kacie S., MD, -[phone]    [facility]  Care Coordination Department  [facility]  [phone]       Patient Name: Maggy Sheets  : 1950  Patient Phone #: [phone]

## 2024-03-21 ENCOUNTER — TELEPHONE (OUTPATIENT)
Dept: FAMILY MEDICINE | Facility: CLINIC | Age: 74
End: 2024-03-21
Payer: COMMERCIAL

## 2024-03-21 ENCOUNTER — HOSPITAL ENCOUNTER (OUTPATIENT)
Facility: HOSPITAL | Age: 74
Discharge: HOME | End: 2024-03-21
Attending: INTERNAL MEDICINE | Admitting: INTERNAL MEDICINE
Payer: MEDICARE

## 2024-03-21 ENCOUNTER — ANESTHESIA (OUTPATIENT)
Dept: ENDOSCOPY | Facility: HOSPITAL | Age: 74
End: 2024-03-21
Payer: MEDICARE

## 2024-03-21 VITALS
TEMPERATURE: 97 F | BODY MASS INDEX: 17.19 KG/M2 | WEIGHT: 113.44 LBS | RESPIRATION RATE: 18 BRPM | OXYGEN SATURATION: 96 % | DIASTOLIC BLOOD PRESSURE: 76 MMHG | HEART RATE: 76 BPM | HEIGHT: 68 IN | SYSTOLIC BLOOD PRESSURE: 118 MMHG

## 2024-03-21 DIAGNOSIS — R11.2 NAUSEA AND VOMITING, UNSPECIFIED VOMITING TYPE: ICD-10-CM

## 2024-03-21 DIAGNOSIS — K21.9 GERD (GASTROESOPHAGEAL REFLUX DISEASE): ICD-10-CM

## 2024-03-21 DIAGNOSIS — K21.9 GASTROESOPHAGEAL REFLUX DISEASE WITHOUT ESOPHAGITIS: Primary | ICD-10-CM

## 2024-03-21 PROCEDURE — 37000008 HC ANESTHESIA 1ST 15 MINUTES: Performed by: INTERNAL MEDICINE

## 2024-03-21 PROCEDURE — 43239 EGD BIOPSY SINGLE/MULTIPLE: CPT | Mod: ,,, | Performed by: INTERNAL MEDICINE

## 2024-03-21 PROCEDURE — 63600175 PHARM REV CODE 636 W HCPCS: Performed by: NURSE ANESTHETIST, CERTIFIED REGISTERED

## 2024-03-21 PROCEDURE — 43239 EGD BIOPSY SINGLE/MULTIPLE: CPT | Performed by: INTERNAL MEDICINE

## 2024-03-21 PROCEDURE — 88305 TISSUE EXAM BY PATHOLOGIST: CPT | Mod: 26,,, | Performed by: PATHOLOGY

## 2024-03-21 PROCEDURE — 88305 TISSUE EXAM BY PATHOLOGIST: CPT | Performed by: PATHOLOGY

## 2024-03-21 PROCEDURE — D9220A PRA ANESTHESIA: Mod: ,,, | Performed by: NURSE ANESTHETIST, CERTIFIED REGISTERED

## 2024-03-21 PROCEDURE — 37000009 HC ANESTHESIA EA ADD 15 MINS: Performed by: INTERNAL MEDICINE

## 2024-03-21 PROCEDURE — 88341 IMHCHEM/IMCYTCHM EA ADD ANTB: CPT | Performed by: PATHOLOGY

## 2024-03-21 PROCEDURE — 88342 IMHCHEM/IMCYTCHM 1ST ANTB: CPT | Mod: 26,,, | Performed by: PATHOLOGY

## 2024-03-21 PROCEDURE — 88342 IMHCHEM/IMCYTCHM 1ST ANTB: CPT | Performed by: PATHOLOGY

## 2024-03-21 PROCEDURE — 27201012 HC FORCEPS, HOT/COLD, DISP: Performed by: INTERNAL MEDICINE

## 2024-03-21 PROCEDURE — 25000003 PHARM REV CODE 250: Performed by: NURSE ANESTHETIST, CERTIFIED REGISTERED

## 2024-03-21 PROCEDURE — 63600175 PHARM REV CODE 636 W HCPCS: Performed by: INTERNAL MEDICINE

## 2024-03-21 RX ORDER — PANTOPRAZOLE SODIUM 40 MG/1
40 TABLET, DELAYED RELEASE ORAL 2 TIMES DAILY
Qty: 60 TABLET | Refills: 4 | Status: SHIPPED | OUTPATIENT
Start: 2024-03-21 | End: 2024-05-20 | Stop reason: SDUPTHER

## 2024-03-21 RX ORDER — LIDOCAINE HYDROCHLORIDE 20 MG/ML
INJECTION, SOLUTION EPIDURAL; INFILTRATION; INTRACAUDAL; PERINEURAL
Status: DISCONTINUED | OUTPATIENT
Start: 2024-03-21 | End: 2024-03-21

## 2024-03-21 RX ORDER — PROPOFOL 10 MG/ML
VIAL (ML) INTRAVENOUS
Status: DISCONTINUED | OUTPATIENT
Start: 2024-03-21 | End: 2024-03-21

## 2024-03-21 RX ORDER — METOCLOPRAMIDE 10 MG/1
10 TABLET ORAL 4 TIMES DAILY
Qty: 40 TABLET | Refills: 1 | Status: SHIPPED | OUTPATIENT
Start: 2024-03-21

## 2024-03-21 RX ORDER — SODIUM CHLORIDE, SODIUM LACTATE, POTASSIUM CHLORIDE, CALCIUM CHLORIDE 600; 310; 30; 20 MG/100ML; MG/100ML; MG/100ML; MG/100ML
INJECTION, SOLUTION INTRAVENOUS CONTINUOUS
Status: DISCONTINUED | OUTPATIENT
Start: 2024-03-21 | End: 2024-03-21 | Stop reason: HOSPADM

## 2024-03-21 RX ADMIN — PROPOFOL 40 MG: 10 INJECTION, EMULSION INTRAVENOUS at 10:03

## 2024-03-21 RX ADMIN — PROPOFOL 130 MG: 10 INJECTION, EMULSION INTRAVENOUS at 10:03

## 2024-03-21 RX ADMIN — LIDOCAINE HYDROCHLORIDE 40 MG: 20 INJECTION, SOLUTION EPIDURAL; INFILTRATION; INTRACAUDAL; PERINEURAL at 10:03

## 2024-03-21 RX ADMIN — SODIUM CHLORIDE, SODIUM LACTATE, POTASSIUM CHLORIDE, AND CALCIUM CHLORIDE: 600; 310; 30; 20 INJECTION, SOLUTION INTRAVENOUS at 10:03

## 2024-03-21 NOTE — TRANSFER OF CARE
"Anesthesia Transfer of Care Note    Patient: Maggy Sheets    Procedure(s) Performed: Procedure(s) (LRB):  EGD (ESOPHAGOGASTRODUODENOSCOPY) (N/A)    Patient location: PACU    Anesthesia Type: general    Transport from OR: Transported from OR on room air with adequate spontaneous ventilation    Post pain: adequate analgesia    Post assessment: no apparent anesthetic complications and tolerated procedure well    Post vital signs: stable    Level of consciousness: awake    Nausea/Vomiting: no nausea/vomiting    Complications: none    Transfer of care protocol was followed      Last vitals: Visit Vitals  /79 (BP Location: Right arm, Patient Position: Sitting)   Pulse 93   Temp 36.1 °C (97 °F)   Resp 16   Ht 5' 8" (1.727 m)   Wt 51.5 kg (113 lb 6.8 oz)   SpO2 97%   Breastfeeding No   BMI 17.25 kg/m²     "

## 2024-03-21 NOTE — TELEPHONE ENCOUNTER
No care due was identified.  [facility] Embedded Care Due Messages. Reference number: [id number].   3/21/2024 3:13:07 PM CDT

## 2024-03-21 NOTE — TELEPHONE ENCOUNTER
Refill Routing Note   Medication(s) are not appropriate for processing by [facility] for the following reason(s):        Outside of protocol    [facility] action(s):  Route               Appointments  past 12m or future 3m with PCP    Date Provider   Last Visit   2/28/2024 Maria Victoria Duque MD   Next Visit   Visit date not found Maria Victoria Duque MD   ED visits in past 90 days: 1        Note composed:5:21 PM 03/21/2024

## 2024-03-21 NOTE — ANESTHESIA POSTPROCEDURE EVALUATION
Anesthesia Post Evaluation    Patient: Maggy Sheets    Procedure(s) Performed: Procedure(s) (LRB):  EGD (ESOPHAGOGASTRODUODENOSCOPY) (N/A)    Final Anesthesia Type: general      Patient location during evaluation: PACU  Patient participation: Yes- Able to Participate  Level of consciousness: awake  Post-procedure vital signs: reviewed and stable  Pain management: adequate  Airway patency: patent    PONV status at discharge: No PONV  Anesthetic complications: no      Cardiovascular status: stable  Respiratory status: unassisted  Hydration status: euvolemic  Follow-up not needed.              Vitals Value Taken Time   /68 03/21/24 1038   Temp 36.3 °C (97.4 °F) 03/21/24 1024   Pulse 80 03/21/24 1040   Resp 22 03/21/24 1040   SpO2 97 % 03/21/24 1040   Vitals shown include unvalidated device data.      No case tracking events are documented in the log.      Pain/Hugh Score: Hugh Score: 8 (3/21/2024 10:24 AM)

## 2024-03-21 NOTE — H&P
Endoscopy History and Physical    PCP - Maria Victoria Duque MD  Referring Physician - Maria Victoria Duque MD  [address] [facility]  [address]  [place] [address]      ASA - per anesthesia  Mallampati - per anesthesia  History of Anesthesia problems - no  Family history Anesthesia problems -  no   Plan of anesthesia - General    HPI  73 y.o. female    Planned Procedure: EGD  Diagnosis: GERD  Chief Complaint: Same as above          ROS:  Constitutional: No fevers, chills, No weight loss  CV: No chest pain  Pulm: No cough, No shortness of breath  GI: see HPI    Medical History:  has a past medical history of Collagenous colitis, Colon polyp, and Hiatal hernia.    Surgical History:  has a past surgical history that includes Appendectomy; Cholecystectomy; Colonoscopy; bladder lift; Hernia repair; Fracture surgery; Tonsillectomy; Hysterectomy; and Tubal ligation.    Family History: family history includes Arthritis in her mother; Colon cancer in her maternal grandmother; Depression in her mother; Vision loss in her father..    Social History:  reports that she has been smoking cigarettes. She has a 3.8 pack-year smoking history. She has never used smokeless tobacco. She reports current alcohol use of about 6.0 standard drinks of alcohol per week. She reports that she does not currently use drugs.    Review of patient's allergies indicates:   Allergen Reactions    Codeine Nausea And Vomiting       Medications:   Medications Prior to Admission   Medication Sig Dispense Refill Last Dose    amlodipine-benazepril 5-20 mg (LOTREL) 5-20 mg per capsule Take 1 capsule by mouth once daily.   3/20/2024    calcium carbonate (TUMS) 200 mg calcium (500 mg) chewable tablet Take 1 tablet by mouth 2 (two) times daily as needed for Heartburn.       citalopram (CELEXA) 10 MG tablet Take 1 tablet (10 mg total) by mouth once daily for 14 days, THEN 2 tablets (20 mg total) once daily for 14 days. 42 tablet 0     DULoxetine (CYMBALTA) 30 MG capsule  Take 1 capsule (30 mg total) by mouth once daily for 14 days, THEN 1 capsule (30 mg total) every other day for 14 days. 21 capsule 0     megestroL (MEGACE) 40 MG Tab Take 1 tablet (40 mg total) by mouth once daily. 30 tablet 11     metoclopramide HCl (REGLAN) 10 MG tablet Take 1 tablet (10 mg total) by mouth 4 (four) times daily. 40 tablet 0     omeprazole (PRILOSEC) 40 MG capsule Take 1 capsule (40 mg total) by mouth before breakfast. 30 capsule 1     ondansetron (ZOFRAN-ODT) 4 MG TbDL Take 1 tablet (4 mg total) by mouth 3 (three) times daily as needed (nausea). 30 tablet 1     potassium chloride (MICRO-K) 10 MEQ CpSR Take 1 capsule (10 mEq total) by mouth once daily. 30 capsule 11     psyllium 0.52 gram capsule Take 0.52 g by mouth once daily.          Physical Exam:    Vital Signs:   Vitals:    03/21/24 0933   BP: 131/79   Pulse: 93   Resp: 16   Temp: 97 °F (36.1 °C)       General Appearance: Well appearing in no acute distress  Abdomen: Soft, non tender, non distended with normal bowel sounds, no masses    Labs:  Lab Results   Component Value Date    WBC 10.13 02/28/2024    HGB 16.3 (H) 02/28/2024    HCT 46.2 02/28/2024     (H) 02/28/2024    ALT 12 02/28/2024    AST 16 02/28/2024     (L) 02/28/2024    K 3.3 (L) 02/28/2024    CL 95 02/28/2024    CREATININE 0.8 02/28/2024    BUN 11 02/28/2024    CO2 25 02/28/2024    TSH 2.362 02/01/2024       I have explained the risks and benefits of this endoscopic procedure to the patient including but not limited to bleeding, inflammation, infection, perforation, and death.    SEDATION PLAN: per anesthesia       History reviewed, vital signs satisfactory, cardiopulmonary status satisfactory, sedation options, risks and plans have been discussed with the patient  All their questions were answered and the patient agrees to the sedation procedures as planned and the patient is deemed an appropriate candidate for the sedation as planned.     The risks, benefits and  alternatives of the procedure were discussed with the patient in detail. This discussion was had in the presence of endoscopy staff. The risks include, risks of adverse reaction to sedation requiring the use of reversal agents, bleeding requiring blood transfusion, perforation requiring surgical intervention and technical failure. Other risks include aspiration leading to respiratory distress and respiratory failure resulting in endotracheal intubation and mechanical ventilation including death. If anesthesia is being utilized for this procedure, it is up to the anesthesiologist to determine airway safety including elective endotracheal intubation. Questions were answered, they agree to proceed. There was no language barriers.       Procedure explained to patient, informed consent obtained and placed in chart.       Danilo Conway MD

## 2024-03-21 NOTE — TELEPHONE ENCOUNTER
----- Message from Deysi Matt sent at 3/21/2024  4:45 PM CDT -----  Contact: Pam  Patient is calling and need a callback from office today. Please call patient [phone]    Type:  RX Refill Request    Who Called: Daughter  Refill or New Rx:Refill  RX Name and Strength:metoclopramide HCl (REGLAN) 10 MG tablet  How is the patient currently taking it? (ex. 1XDay):  Is this a 30 day or 90 day RX:  Preferred Pharmacy with phone number:  [id number] - [place] - [address] [facility] [address] [facility]  [address]  [facility] [address]  Phone: [phone] Fax: [phone]     Local or Mail Order:Local  Ordering Provider:Maria Victoria Duque  Would the patient rather a call back or a response via [facility]? Callback  Best Call Back Number:[phone]  Additional Information: Patient out of medication

## 2024-03-21 NOTE — PROVATION PATIENT INSTRUCTIONS
Discharge Summary/Instructions after an Endoscopic Procedure  Patient Name: Maggy Vazquez  Patient MRN: [id number]  Patient YOB: 1950 Thursday, March 21, 2024  Danilo Conway MD  Dear patient,  As a result of recent federal legislation (The Federal Cures Act), you may   receive lab or pathology results from your procedure in your [facility]   account before your physician is able to contact you. Your physician or   their representative will relay the results to you with their   recommendations at their soonest availability.  Thank you,  RESTRICTIONS:  During your procedure today, you received medications for sedation.  These   medications may affect your judgment, balance and coordination.  Therefore,   for 24 hours, you have the following restrictions:   - DO NOT drive a car, operate machinery, make legal/financial decisions,   sign important papers or drink alcohol.    ACTIVITY:  Today: no heavy lifting, straining or running due to procedural   sedation/anesthesia.  The following day: return to full activity including work.  DIET:  Eat and drink normally unless instructed otherwise.     TREATMENT FOR COMMON SIDE EFFECTS:  - Mild abdominal pain, nausea, belching, bloating or excessive gas:  rest,   eat lightly and use a heating pad.  - Sore Throat: treat with throat lozenges and/or gargle with warm salt   water.  - Because air was used during the procedure, expelling large amounts of air   from your rectum or belching is normal.  - If a bowel prep was taken, you may not have a bowel movement for 1-3 days.    This is normal.  SYMPTOMS TO WATCH FOR AND REPORT TO YOUR PHYSICIAN:  1. Abdominal pain or bloating, other than gas cramps.  2. Chest pain.  3. Back pain.  4. Signs of infection such as: chills or fever occurring within 24 hours   after the procedure.  5. Rectal bleeding, which would show as bright red, maroon, or black stools.   (A tablespoon of blood from the rectum is not serious,  especially if   hemorrhoids are present.)  6. Vomiting.  7. Weakness or dizziness.  GO DIRECTLY TO THE NEAREST EMERGENCY ROOM IF YOU HAVE ANY OF THE FOLLOWING:      Difficulty breathing              Chills and/or fever over 101 F   Persistent vomiting and/or vomiting blood   Severe abdominal pain   Severe chest pain   Black, tarry stools   Bleeding- more than one tablespoon   Any other symptom or condition that you feel may need urgent attention  Your doctor recommends these additional instructions:  If any biopsies were taken, your doctors clinic will contact you in 1 to 2   weeks with any results.  - Discharge patient to home.   - Resume previous diet. Avoid NSAIDS. Acid suppression  - Continue present medications.   - Await pathology results.   - Repeat upper endoscopy in 3 months to evaluate the response to therapy.   - Return to referring physician as previously scheduled.  For questions, problems or results please call your physician Danilo Conway MD at Work:  [phone]  If you have any questions about the above instructions, call the GI   department at [phone] or call the endoscopy unit at [phone]   from 7am until 3 pm.  [facility], EMERGENCY ROOM PHONE NUMBER:   [phone]  IF A COMPLICATION OR EMERGENCY SITUATION ARISES AND YOU ARE UNABLE TO REACH   YOUR PHYSICIAN - GO DIRECTLY TO THE EMERGENCY ROOM.  I have read or have had read to me these discharge instructions for my   procedure and have received a written copy.  I understand these   instructions and will follow-up with my physician if I have any questions.     __________________________________       _____________________________________  Nurse Signature                                          Patient/Designated   Responsible Party Signature  MD Danilo Gomez MD  3/21/2024 10:25:10 AM  This report has been verified and signed electronically.  Dear patient,  As a  result of recent federal legislation (The Federal Cures Act), you may   receive lab or pathology results from your procedure in your [facility]   account before your physician is able to contact you. Your physician or   their representative will relay the results to you with their   recommendations at their soonest availability.  Thank you,  PROVATION

## 2024-03-21 NOTE — DISCHARGE SUMMARY
[facility]  Discharge Note  Short Stay    Procedure(s) (LRB):  EGD (ESOPHAGOGASTRODUODENOSCOPY) (N/A)      OUTCOME: Patient tolerated treatment/procedure well without complication and is now ready for discharge.    DISPOSITION: Home or Self Care    FINAL DIAGNOSIS:  <principal problem not specified>    FOLLOWUP: With primary care provider    DISCHARGE INSTRUCTIONS:  No discharge procedures on file.     TIME SPENT ON DISCHARGE: 20 minutes

## 2024-03-24 DIAGNOSIS — F32.2 CURRENT SEVERE EPISODE OF MAJOR DEPRESSIVE DISORDER WITHOUT PSYCHOTIC FEATURES WITHOUT PRIOR EPISODE: ICD-10-CM

## 2024-03-25 RX ORDER — DULOXETIN HYDROCHLORIDE 30 MG/1
CAPSULE, DELAYED RELEASE ORAL
Qty: 21 CAPSULE | Refills: 0 | OUTPATIENT
Start: 2024-03-25

## 2024-03-25 RX ORDER — CITALOPRAM 20 MG/1
20 TABLET, FILM COATED ORAL DAILY
Qty: 30 TABLET | Refills: 11 | Status: SHIPPED | OUTPATIENT
Start: 2024-03-25 | End: 2025-03-25

## 2024-03-25 NOTE — TELEPHONE ENCOUNTER
----- Message from Darcy Martin sent at 3/25/2024  1:29 PM CDT -----  Regarding: Pt would like a call  Pt came in clinic and would like to speak to Dr. Duque and/or her staff regarding getting on new medications before she goes back home to [place].     Pt states she already spoke to Dr. Duque regarding her weaning herself off of her previous medication and is waiting to hear back about getting her new medication    Pt call back # is [phone]    
Attempted to return call, no answer, msg on recorder.   
I have signed for the following orders AND/OR meds.  Please call the patient and ask the patient to schedule the testing AND/OR inform about any medications that were sent. Medications have been sent to pharmacy listed below      No orders of the defined types were placed in this encounter.      Medications Ordered This Encounter   Medications    citalopram (CELEXA) 20 MG tablet     Sig: Take 1 tablet (20 mg total) by mouth once daily.     Dispense:  30 tablet     Refill:  11         [id number] - [place] - [address] AT [place] [address] [address]  [place] [address]  Phone: [phone] Fax: [phone]    
No care due was identified.  [facility] Embedded Care Due Messages. Reference number: [id number].   3/24/2024 3:43:33 AM CDT  
Refill Routing Note   Medication(s) are not appropriate for processing by [facility] for the following reason(s):        New or recently adjusted medication    [facility] action(s):  Defer        Medication Therapy Plan: titration dosing and weaning off.      Appointments  past 12m or future 3m with PCP    Date Provider   Last Visit   2/28/2024 Maria Victoria Duque MD   Next Visit   Visit date not found Maria Victoria Duque MD   ED visits in past 90 days: 1        Note composed:2:02 PM 03/25/2024          
no

## 2024-03-26 ENCOUNTER — PATIENT MESSAGE (OUTPATIENT)
Dept: FAMILY MEDICINE | Facility: CLINIC | Age: 74
End: 2024-03-26
Payer: COMMERCIAL

## 2024-03-26 DIAGNOSIS — F32.2 CURRENT SEVERE EPISODE OF MAJOR DEPRESSIVE DISORDER WITHOUT PSYCHOTIC FEATURES WITHOUT PRIOR EPISODE: ICD-10-CM

## 2024-03-26 NOTE — TELEPHONE ENCOUNTER
----- Message from Phoebe Mayen sent at 3/26/2024  4:09 PM CDT -----  Contact: Maggy Lazo is calling in regards to wanting to discuss test and medication.please call back at .[phone]           Thanks  RL

## 2024-03-26 NOTE — PROGRESS NOTES
2nd Req-MARK faxed to Dr. Perales[facility]/Repeat Recommendation  F: [phone]

## 2024-03-26 NOTE — TELEPHONE ENCOUNTER
Attempted to contact patient, no answer, message on recorder to return call.  
Attempted to contact patient, no answer, message on recorder to return call.   
Next Appt this Specialty: With Family Medicine (Maria Victoria Duque MD)  04/15/2024 at 9:00 AM  
Please schedule follow up with me in clinic in 4 weeks  
Ambulance

## 2024-03-26 NOTE — TELEPHONE ENCOUNTER
No care due was identified.  [facility] Embedded Care Due Messages. Reference number: [id number].   3/26/2024 2:46:01 PM CDT

## 2024-03-27 LAB
FINAL PATHOLOGIC DIAGNOSIS: NORMAL
GROSS: NORMAL
Lab: NORMAL

## 2024-03-27 RX ORDER — CITALOPRAM 20 MG/1
20 TABLET, FILM COATED ORAL DAILY
Qty: 30 TABLET | Refills: 11 | OUTPATIENT
Start: 2024-03-27 | End: 2025-03-27

## 2024-03-27 NOTE — TELEPHONE ENCOUNTER
Refill Decision Note   Maggy Kortney  is requesting a refill authorization.  Brief Assessment and Rationale for Refill:  Quick Discontinue     Medication Therapy Plan:  Receipt confirmed by pharmacy (3/25/2024  3:51 PM CDT)      Comments:     Note composed:10:08 AM 03/27/2024

## 2024-04-01 NOTE — PROGRESS NOTES
Manually uploaded and linked Colon Pathology-Colon Repeat Recommendation to     Manually linked Colonoscopy yo HM from media

## 2024-04-09 DIAGNOSIS — R11.2 NON-INTRACTABLE VOMITING WITH NAUSEA: ICD-10-CM

## 2024-04-09 DIAGNOSIS — R12 HEARTBURN: ICD-10-CM

## 2024-04-10 ENCOUNTER — TELEPHONE (OUTPATIENT)
Dept: FAMILY MEDICINE | Facility: CLINIC | Age: 74
End: 2024-04-10
Payer: COMMERCIAL

## 2024-04-10 RX ORDER — OMEPRAZOLE 40 MG/1
CAPSULE, DELAYED RELEASE ORAL
Qty: 90 CAPSULE | OUTPATIENT
Start: 2024-04-10

## 2024-04-10 NOTE — TELEPHONE ENCOUNTER
----- Message from Cathy Scott sent at 4/10/2024 11:21 AM CDT -----  Regarding: medical Advice  Contact: Maggy  ..Type:  Needs Medical Advice    Who Called:  Maggy   Symptoms (please be specific):    How long has patient had these symptoms:    Pharmacy name and phone #:    Would the patient rather a call back or a response via [facility]? call  Best Call Back Number:  [phone] (home)    Additional Information:  Maggy would like to speak to the provider over the phone today.

## 2024-04-17 ENCOUNTER — PATIENT MESSAGE (OUTPATIENT)
Dept: FAMILY MEDICINE | Facility: CLINIC | Age: 74
End: 2024-04-17
Payer: COMMERCIAL

## 2024-07-22 PROBLEM — R19.7 DIARRHEA: Status: ACTIVE | Noted: 2024-07-22

## 2024-07-22 PROBLEM — K57.30 DIVERTICULOSIS OF LARGE INTESTINE WITHOUT DIVERTICULITIS: Status: ACTIVE | Noted: 2024-07-22

## 2024-07-22 PROBLEM — K52.831 COLITIS, COLLAGENOUS: Status: ACTIVE | Noted: 2024-07-22

## 2024-07-22 PROBLEM — Z86.0100 PERSONAL HISTORY OF COLONIC POLYPS: Status: ACTIVE | Noted: 2024-07-22

## 2024-07-22 PROBLEM — K22.5 ZENKER'S DIVERTICULUM: Status: ACTIVE | Noted: 2024-07-22

## 2025-07-31 PROBLEM — R63.4 ABNORMAL WEIGHT LOSS: Status: ACTIVE | Noted: 2025-07-31

## 2025-07-31 PROBLEM — G89.18 POSTOPERATIVE PAIN: Status: ACTIVE | Noted: 2025-07-31

## 2025-07-31 PROBLEM — I10 ESSENTIAL HYPERTENSION: Status: ACTIVE | Noted: 2025-07-31

## 2025-08-04 ENCOUNTER — PATIENT OUTREACH (OUTPATIENT)
Dept: ADMINISTRATIVE | Facility: CLINIC | Age: 75
End: 2025-08-04
Payer: MEDICARE

## 2025-08-04 NOTE — PROGRESS NOTES
C3 nurse spoke with Maggy Sheets for a TCC post hospital discharge follow up call. The patient has a scheduled [facility] appointment with Louann Giron MD 8/6/25 @2pm.

## 2025-08-06 PROBLEM — N39.46 MIXED INCONTINENCE: Status: ACTIVE | Noted: 2025-08-06

## 2025-08-06 PROBLEM — E87.6 HYPOKALEMIA: Status: ACTIVE | Noted: 2025-08-06

## 2025-08-06 PROBLEM — N81.10 FEMALE BLADDER PROLAPSE: Status: ACTIVE | Noted: 2025-08-06

## 2025-08-06 PROBLEM — R31.29 MICROSCOPIC HEMATURIA: Status: ACTIVE | Noted: 2025-08-06

## 2025-08-06 PROBLEM — N90.5 ATROPHY OF VULVA: Status: ACTIVE | Noted: 2025-08-06

## 2025-08-06 PROBLEM — K31.84 GASTROPARESIS: Status: ACTIVE | Noted: 2025-06-02
